# Patient Record
Sex: MALE | Race: WHITE | NOT HISPANIC OR LATINO
[De-identification: names, ages, dates, MRNs, and addresses within clinical notes are randomized per-mention and may not be internally consistent; named-entity substitution may affect disease eponyms.]

---

## 2020-04-22 ENCOUNTER — APPOINTMENT (OUTPATIENT)
Dept: ORTHOPEDIC SURGERY | Facility: CLINIC | Age: 60
End: 2020-04-22
Payer: COMMERCIAL

## 2020-04-22 VITALS
HEART RATE: 85 BPM | TEMPERATURE: 97.9 F | BODY MASS INDEX: 34.46 KG/M2 | WEIGHT: 260 LBS | SYSTOLIC BLOOD PRESSURE: 156 MMHG | HEIGHT: 73 IN | DIASTOLIC BLOOD PRESSURE: 97 MMHG

## 2020-04-22 PROCEDURE — 20610 DRAIN/INJ JOINT/BURSA W/O US: CPT | Mod: LT

## 2020-04-22 PROCEDURE — 99203 OFFICE O/P NEW LOW 30 MIN: CPT | Mod: 25

## 2020-04-22 PROCEDURE — 73562 X-RAY EXAM OF KNEE 3: CPT | Mod: 50

## 2020-04-22 RX ORDER — MELOXICAM 15 MG/1
15 TABLET ORAL DAILY
Qty: 40 | Refills: 1 | Status: ACTIVE | COMMUNITY
Start: 2020-04-22 | End: 1900-01-01

## 2020-04-22 RX ORDER — HYALURONATE SOD, CROSS-LINKED 30 MG/3 ML
30 SYRINGE (ML) INTRAARTICULAR
Qty: 1 | Refills: 0 | Status: ACTIVE | OUTPATIENT
Start: 2020-04-22

## 2020-04-22 RX ORDER — METHYLPRED ACET/NACL,ISO-OS/PF 40 MG/ML
40 VIAL (ML) INJECTION
Qty: 1 | Refills: 0 | Status: COMPLETED | OUTPATIENT
Start: 2020-04-22

## 2020-04-22 RX ORDER — LIDOCAINE HYDROCHLORIDE 10 MG/ML
1 INJECTION, SOLUTION INFILTRATION; PERINEURAL
Refills: 0 | Status: COMPLETED | OUTPATIENT
Start: 2020-04-22

## 2020-04-22 RX ADMIN — METHYLPREDNISOLONE ACETATE 2 MG/ML: 40 INJECTION, SUSPENSION INTRA-ARTICULAR; INTRALESIONAL; INTRAMUSCULAR; SOFT TISSUE at 00:00

## 2020-04-22 RX ADMIN — LIDOCAINE HYDROCHLORIDE 3 %: 10 INJECTION, SOLUTION INFILTRATION; PERINEURAL at 00:00

## 2020-09-22 ENCOUNTER — APPOINTMENT (OUTPATIENT)
Dept: ORTHOPEDIC SURGERY | Facility: CLINIC | Age: 60
End: 2020-09-22

## 2022-04-25 ENCOUNTER — APPOINTMENT (OUTPATIENT)
Dept: ORTHOPEDIC SURGERY | Facility: CLINIC | Age: 62
End: 2022-04-25
Payer: MEDICARE

## 2022-04-25 ENCOUNTER — NON-APPOINTMENT (OUTPATIENT)
Age: 62
End: 2022-04-25

## 2022-04-25 VITALS — SYSTOLIC BLOOD PRESSURE: 150 MMHG | HEART RATE: 68 BPM | DIASTOLIC BLOOD PRESSURE: 82 MMHG

## 2022-04-25 PROCEDURE — 73562 X-RAY EXAM OF KNEE 3: CPT | Mod: 50

## 2022-04-25 PROCEDURE — 20610 DRAIN/INJ JOINT/BURSA W/O US: CPT | Mod: LT

## 2022-04-25 PROCEDURE — 99214 OFFICE O/P EST MOD 30 MIN: CPT | Mod: 25

## 2022-05-27 ENCOUNTER — APPOINTMENT (OUTPATIENT)
Dept: ORTHOPEDIC SURGERY | Facility: CLINIC | Age: 62
End: 2022-05-27
Payer: MEDICARE

## 2022-05-27 VITALS — WEIGHT: 260 LBS | BODY MASS INDEX: 34.46 KG/M2 | HEIGHT: 73 IN

## 2022-05-27 VITALS — BODY MASS INDEX: 34.46 KG/M2 | HEIGHT: 73 IN | WEIGHT: 260 LBS

## 2022-05-27 PROCEDURE — 99214 OFFICE O/P EST MOD 30 MIN: CPT

## 2022-05-27 NOTE — HISTORY OF PRESENT ILLNESS
[Stable] : stable [Walking] : walking [Standing] : standing [Intermit.] : ~He/She~ states the symptoms seem to be intermittent [de-identified] : Patient is a 61-year-old male who presents today for an evaluation regarding his left knee he states that it started most recently several months ago with an on and off discomfort in his knee. Recently underwent a right total knee replacement performed by Dr. Smith and did very well. He did very well for many years but has this pain in the left knee started he decided to seek medical attention with her progress. He was seen in the office and given a cortisone injection several weeks ago. Which did improve his condition and condition tremendously. Currently he states the pain is a one on a scale of 1-10 prior to the injection the pain was close to it. He states the pain is on and off particularly with more strenuous activities including prolonged standing and walking. He also states that he gets some swelling and some stiffness at times

## 2022-05-27 NOTE — REASON FOR VISIT
[Initial Visit] : an initial visit for [Knee Pain] : knee pain [FreeTextEntry2] : Consult left knee pain. Referred by Dr. Sanchez. Had a cortisone injection tot he left knee about a month ago which helped to relieve pain.

## 2022-05-27 NOTE — END OF VISIT
[Time Spent: ___ minutes] : I have spent [unfilled] minutes of time on the encounter. [FreeTextEntry3] : I, Dr. Torres, personally performed the evaluation and management services of this new patient. That E/M includes conducting the initial examination, assessing all  conditions, and establishing the plan of care. Today, MY ACP was here to observe my evaluation and management services of this patient to be followed going forward.\par

## 2022-05-27 NOTE — PHYSICAL EXAM
[Normal] : Gait: normal [LE] : Sensory: Intact in bilateral lower extremities [ALL] : dorsalis pedis, posterior tibial, femoral, popliteal, and radial 2+ and symmetric bilaterally [de-identified] : On physical examination of the left knee. The skin is clean dry and intact with no areas of swelling is noted. There is some pain and tenderness at the patellofemoral compartment as well as the medial femoral tibial compartment. He has excellent range of motion both passively and actively without any discomfort. There is no evidence of ligamentous laxity. Neurovascular intact with no evidence of any motor or sensory deficit. Patient has good distal pulses with no calf tenderness. [de-identified] : X-rays of the left knee reveal some osteoarthritic changes. No evidence of any fracture or dislocation. Some osteophytes with joint space narrowing is noted. These x-rays were not taken today. They were taken previously in the office.

## 2022-05-27 NOTE — DISCUSSION/SUMMARY
[de-identified] : After a thorough history full examination and review of the x-rays. It was explained to the patient that he does have osteoarthritic changes within the left knee. He was explained the different modalities of treatment which include conservative versus surgical intervention. He has had good success with management. It is recommended that he continue with his management. This includes is moist heat anti-inflammatories and pain medication when needed. The patient was also explained that he can continue with the cortisone injections and these had good relief as well as in the past with cortisone. He was explained the risks benefits pros and injection as well as alternatives. He wasn't splinted there is no guarantee for complete relief and that this is not a cure for the osteoarthritis. He did understand and was also explained in the foreseeable future he will require a total knee replacement. However at the present time it is suggested that he continue with his conservative management. The patient was turned back to the office should this pain return or worsen.\par \par I saw and evaluated the patient. I discussed and reviewed the case details with the PA and agree with the PA's findings and plan as documented in the PA note.\par \par

## 2022-12-09 DIAGNOSIS — M25.552 PAIN IN LEFT HIP: ICD-10-CM

## 2023-01-06 ENCOUNTER — APPOINTMENT (OUTPATIENT)
Dept: ORTHOPEDIC SURGERY | Facility: CLINIC | Age: 63
End: 2023-01-06
Payer: MEDICARE

## 2023-01-06 ENCOUNTER — NON-APPOINTMENT (OUTPATIENT)
Age: 63
End: 2023-01-06

## 2023-01-06 VITALS
WEIGHT: 260 LBS | BODY MASS INDEX: 34.46 KG/M2 | DIASTOLIC BLOOD PRESSURE: 84 MMHG | HEART RATE: 72 BPM | HEIGHT: 73 IN | SYSTOLIC BLOOD PRESSURE: 132 MMHG

## 2023-01-06 PROCEDURE — 20610 DRAIN/INJ JOINT/BURSA W/O US: CPT | Mod: LT

## 2023-01-06 PROCEDURE — 99213 OFFICE O/P EST LOW 20 MIN: CPT | Mod: 25

## 2023-01-06 PROCEDURE — 73521 X-RAY EXAM HIPS BI 2 VIEWS: CPT

## 2023-01-06 PROCEDURE — 73562 X-RAY EXAM OF KNEE 3: CPT | Mod: RT

## 2023-01-06 NOTE — HISTORY OF PRESENT ILLNESS
[de-identified] : Patient presents today for evaluation of ongoing right hip and thigh and left knee pain.  The patient was last seen in May.  Patient is known to have advanced osteoarthritis of the left knee.  He is status post right total knee arthroplasty about 15 years ago.  He has no active complaints of his right knee.  He currently reports of stiffness when going to stand from a seated position.  He has difficulties with navigating stairs.  He reports of pain to both areas when he walks for an extended period of time.  He is not clear as to which side bothers him more.  He is not using a cane or a walker.  No buckling or locking.  No use of a brace.  He is not regularly taking oral pain medications for the complaint.\par \par Review of Systems-\par Constitutional: No fever or chills. \par Cardiovascular: No orthopnea or chest pain\par Pulmonary: No shortness of breath. \par GI: No nausea or vomiting or abdominal pain.\par Musculoskeletal: see HPI \par Psychiatric: No anxiety and depression.

## 2023-01-06 NOTE — PHYSICAL EXAM
[de-identified] : The patient is conversive and in no apparent distress. The patient is alert and oriented to person, place, and time. Affect and mood appear normal. The head is normocephalic and atraumatic. Skin shows normal turgor with no evidence of eczema or psoriasis. No respiratory distress noted. Sensation grossly intact. MUSCULOSKELETAL:   SEE BELOW\par \par Bilateral knee exam demonstrates normal alignment of the right knee and mildly varus alignment of the left knee(less than -2 degrees).  Well-healed surgical incision of the right knee consistent with total knee arthroplasty.  No large surgical incisions of the right knee.  No signs of acute trauma.  Normal temperature.  No effusions.  No erythema or ecchymosis.  Passive range of motion of both knees is 0 degrees of extension to 110 degrees of flexion.  Mild patellofemoral crepitus of the left knee.  No crepitus of the right knee.  Minimal medial/lateral laxity of either knee.  No flexion contracture.  No extensor mechanism lag.\par \par Bilateral hip exam demonstrates equal leg lengths.  Stiffness of the right hip is mildly greater than the left hip.  Left hip range of motion demonstrates external rotation of 40 degrees and internal rotation of 10 degrees.  Hip flexion is 60 degrees.  Hip abduction is 10 degrees and hip abduction is 25 degrees.  Right hip flexion is 60 degrees.  Hip abduction is neutral.  Hip abduction is 20 degrees.  Internal rotation of neutral.  External rotation of 20 degrees.\par \par Mild varicose veins distally.  No significant venous stasis.  No ulcerations.  2+ DP pulse.  Normal sensation to light touch.  5/5 motor function against resistance of the hips, knees and ankles.  [de-identified] : Bilateral hip and AP view demonstrates marked joint space narrowing of the right hip.  Normal joint spacing of the left hip is appreciated.  No hardware of the hips is appreciated.\par \par Bilateral three-view knee x-rays were reviewed.  Right total knee arthroplasty ([employer] implants) are noted.  Left knee demonstrates marked medial joint space narrowing diffusely.  Osteophyte formation is noted diffusely.

## 2023-01-06 NOTE — DISCUSSION/SUMMARY
[de-identified] : 25 minutes was spent reviewing the x-rays as well as discussing with the patient their clinical presentation, diagnosis and providing education.  The patient was shown the advance degenerative changes of the right hip and the left knee.  The patient has had success with past left knee corticosteroid injections.  A repeat injection was performed today.  He will monitor for improvement.  The patient will be sent for an ultrasound-guided right hip intra-articular corticosteroid and anesthetic injection.  The patient will return to the office in 2 to 3 months for reevaluation.  Meloxicam was prescribed.  Instruction and education provided.  He will modify activities.  He will be seen sooner should his condition change.  All questions were answered to the patient satisfaction.

## 2023-01-06 NOTE — REASON FOR VISIT
[Follow-Up Visit] : a follow-up visit for [Hip Pain] : hip pain [Artificial Knee Joint] : an artificial knee joint

## 2023-01-06 NOTE — END OF VISIT
[FreeTextEntry3] : I, Dr. Torres, personally performed the evaluation and management services for this established patient who presented today with a new problem/exacerbation of an existing condition. That E/M includes conducting the examination, assessing all new/exacerbated conditions, and establishing a new plan of care. Today, MY ACP was here to assist my evaluation and management services of this new problem/exacerbated condition to be followed going forward.\par

## 2023-01-06 NOTE — PROCEDURE
[de-identified] : Procedure: Injection of the left knee. \par Indication:  Osteoarthritis. \par Risk and benefits were discussed with the patient. Potential complications include bleeding and infection. Verbal consent was obtained prior to the procedure. \par Chloraprep was used to prep the area. ethyl chloride spray was used as a topical anesthetic. Using sterile technique, the aspiration/injection needle was then directed from a [default value] aspect was used to inject 5 mL of 1% Lidocaine and 2 mL of 40mg/mL methylprednisolone. A bandage was applied. The patient tolerated the procedure well. \par Complications: none. \par Patient instructed to avoid strenuous activity for 1 day(s). \par Follow-up in the office as needed.

## 2023-05-22 RX ORDER — MELOXICAM 15 MG/1
15 TABLET ORAL
Qty: 30 | Refills: 0 | Status: ACTIVE | COMMUNITY
Start: 2023-01-06 | End: 1900-01-01

## 2023-06-27 ENCOUNTER — APPOINTMENT (OUTPATIENT)
Dept: ORTHOPEDIC SURGERY | Facility: CLINIC | Age: 63
End: 2023-06-27
Payer: MEDICARE

## 2023-06-27 VITALS
SYSTOLIC BLOOD PRESSURE: 149 MMHG | HEART RATE: 72 BPM | BODY MASS INDEX: 34.46 KG/M2 | HEIGHT: 73 IN | DIASTOLIC BLOOD PRESSURE: 89 MMHG | WEIGHT: 260 LBS

## 2023-06-27 DIAGNOSIS — Z96.659 PRESENCE OF UNSPECIFIED ARTIFICIAL KNEE JOINT: ICD-10-CM

## 2023-06-27 DIAGNOSIS — M16.11 UNILATERAL PRIMARY OSTEOARTHRITIS, RIGHT HIP: ICD-10-CM

## 2023-06-27 PROCEDURE — 73502 X-RAY EXAM HIP UNI 2-3 VIEWS: CPT | Mod: RT

## 2023-06-27 PROCEDURE — 73562 X-RAY EXAM OF KNEE 3: CPT | Mod: RT

## 2023-06-27 PROCEDURE — 99214 OFFICE O/P EST MOD 30 MIN: CPT

## 2023-06-27 RX ORDER — MELOXICAM 15 MG/1
15 TABLET ORAL
Qty: 30 | Refills: 0 | Status: ACTIVE | COMMUNITY
Start: 2023-06-27 | End: 1900-01-01

## 2023-06-28 NOTE — PHYSICAL EXAM
[LE] : Sensory: Intact in bilateral lower extremities [DP] : dorsalis pedis 2+ and symmetric bilaterally [Normal RLE] : Right Lower Extremity: No scars, rashes, lesions, ulcers, skin intact [Normal Touch] : sensation intact for touch [Normal] : no peripheral adenopathy appreciated [de-identified] : On physical exam of the right hip skin is warm and dry without erythema ecchymosis signs or symptoms of infection superficial or deep.  There is tenderness noted at the lateral hip.  Range of motion is limited in flexion, external and internal rotation secondary to pain.  Strength is maintained in all planes.  Sensation is intact throughout the distal lower extremity.  There are 2+ dorsalis pedis pulse.  Leg lengths appear equal measured at the medial malleolus. Negative Jeni's exam \par \par On physical exam of the right knee skin is warm and dry without erythema ecchymosis signs or symptoms of infection superficial or deep, there is a well healed surgical incision.  There is no tenderness to palpation throughout the knee joint.  There is no palpable effusion.  Range of motion is 0-130 without pain or stiffness.  The knee is stable with varus and valgus stress.  There is a negative anterior posterior drawer.  Sensation is intact throughout the distal lower extremity.  Strength is well-maintained in all planes.  There is negative Homans exam.  2+ dorsalis pedis pulse.  [de-identified] : Radiographs of the pelvis and right hip were performed today in the [place] office.\par Grade 4 bone-on-bone cartilaginous wear in the F- A joint.\par Subchondral sclerosis noted on both sides the F-A  joint.\par Superior and inferior rim osteophytes are noted.\par \par Radiographs of the right knee were performed today. There are stable interfaces between bone, cement, and implant in all 3 components. There is stable positioning of the femoral, tibial, and patellar components. There is equidistant spacing on each side of the tibial bearing. There is no fracture, foreign body, subsidement, osteolysis, or notable effusion. The patellar component is tracking centrally in the trochlear groove of the femoral component.

## 2023-06-28 NOTE — REASON FOR VISIT
[Follow-Up Visit] : a follow-up visit for [Hip Pain] : hip pain [Knee Pain] : knee pain [FreeTextEntry2] : S/P Right knee TKR DOS 3/5/2007, Right hip pain for 3 months worsening

## 2023-06-28 NOTE — DISCUSSION/SUMMARY
[Medication Risks Reviewed] : Medication risks reviewed [Surgical risks reviewed] : Surgical risks reviewed [de-identified] : Dr. Torres evaluated this patient and will be guiding this patient's entire orthopedic management plan. The patient was advised that based upon their clinical presentation and radiographic findings they meet inclusion criteria for benefitting from a right total hip arthroplasty as a treatment option for severe arthritis of the hip.\par \par The spectrum of treatments for severe hip DJD were reviewed in detail. I reviewed in detail the goals, alternatives, risks and benefits of total hip arthroplasty.\par \par The patient was advised of the possible complications which are inclusive of but not exclusive to VTE-related, infectious-related, anesthetic-related, surgically-related, medically-related, dislocation-related, and implant-related.\par \par The patient was advised that limb length equality may not be assured secondary to variabilities in pelvic malrotation, stable intraoperative fit, opposite side wear, and other anatomic deformities of the lower extremity. The patient agreed to the plan of care, as well as use of implants.\par \par A non-cemented type hip implant is utilized after consideration of bony integrity intraoperatively. The patient was demonstrated. A model of the total hip replacement. Advised on the brand and preliminary model of the implant that I use. A DJO with Ceramic on Poly bearing surfaces is the implant I feel comfortable utilizing in my Primary THRs and the implant I am planning for her/his THR. If the patient wishes to utilize a different implant brand/type for their THR, they may obtain an additional surgical opinion from a Surgeon utilizing that brand implant\par \par The patient was advised that they will require a medical preoperative risk evaluation by their PCP. Further medical subspecialty clearances such as cardiac may be indicated if felt needed by their PCP.\par \par The patient was advised he/she may call our office after careful consideration of their treatment options and further consultation with any other physician to begin the process of preoperative planning for elective Right anterior THR at a time of their choosing.

## 2023-06-28 NOTE — HISTORY OF PRESENT ILLNESS
[Worsening] : worsening [2] : a current pain level of 2/10 [7] : an average pain level of 7/10 [8] : a maximum pain level of 8/10 [Sitting] : sitting [Standing] : standing [Daily] : ~He/She~ states the symptoms seem to be occuring daily [Bending] : worsened by bending [Hip Movement] : worsened by hip movement [Walking] : worsened by walking [NSAIDs] : relieved by nonsteroidal anti-inflammatory drugs [Rest] : relieved by rest [de-identified] : 63-year-old male presents for follow-up of the right hip as well as the right knee.  He is status post right total knee replacement on 3/5/2007.  Since the last office visit his symptoms are remaining stable.  In the right hip his pain currently is at a 2 out of 10 however going up and down stairs as well as walking up an incline can make the pain go up to a 7 out of 10.  He had a ultrasound-guided cortisone injection into the right hip without any improvement.  He is continuing to have right sided groin pain as well as pain into the buttocks.  He is an avid golfer and he is having difficulty playing golf due to pain.  He often has to rest for the day before as well as premedicate with meloxicam in order to get through the game and then the next day he is in a significant amount of pain.  He is taking meloxicam with some improvement in symptoms.  Regards to the right knee currently the pain is rated as a 0 out of 10 however can worsen with use to about 4 out of 10.\par Denies any fevers chills chest pain calf pain shortness of breath

## 2023-06-28 NOTE — END OF VISIT
[FreeTextEntry3] : I, Dr. Ni Torres MD, personally performed the evaluation and management services for this established patient who presented today with a new problem/exacerbation of an existing condition. That E/M includes conducting the examination, assessing all new/exacerbated conditions, and establishing a new plan of care. Today, MY ACP was here to assist with recording the history in my evaluation and management services of this new problem/exacerbated condition to be followed going forward.\par

## 2023-09-08 ENCOUNTER — OUTPATIENT (OUTPATIENT)
Dept: OUTPATIENT SERVICES | Facility: HOSPITAL | Age: 63
LOS: 1 days | End: 2023-09-08
Payer: MEDICARE

## 2023-09-08 VITALS
SYSTOLIC BLOOD PRESSURE: 120 MMHG | TEMPERATURE: 97 F | WEIGHT: 267.86 LBS | HEIGHT: 72 IN | DIASTOLIC BLOOD PRESSURE: 72 MMHG | OXYGEN SATURATION: 97 % | RESPIRATION RATE: 16 BRPM | HEART RATE: 72 BPM

## 2023-09-08 DIAGNOSIS — M16.11 UNILATERAL PRIMARY OSTEOARTHRITIS, RIGHT HIP: ICD-10-CM

## 2023-09-08 DIAGNOSIS — Z91.89 OTHER SPECIFIED PERSONAL RISK FACTORS, NOT ELSEWHERE CLASSIFIED: ICD-10-CM

## 2023-09-08 DIAGNOSIS — Z96.651 PRESENCE OF RIGHT ARTIFICIAL KNEE JOINT: Chronic | ICD-10-CM

## 2023-09-08 DIAGNOSIS — Z96.611 PRESENCE OF RIGHT ARTIFICIAL SHOULDER JOINT: Chronic | ICD-10-CM

## 2023-09-08 LAB
A1C WITH ESTIMATED AVERAGE GLUCOSE RESULT: 6 % — HIGH (ref 4–5.6)
ALBUMIN SERPL ELPH-MCNC: 4 G/DL — SIGNIFICANT CHANGE UP (ref 3.3–5)
ALP SERPL-CCNC: 73 U/L — SIGNIFICANT CHANGE UP (ref 30–120)
ALT FLD-CCNC: 33 U/L — SIGNIFICANT CHANGE UP (ref 10–60)
ANION GAP SERPL CALC-SCNC: 8 MMOL/L — SIGNIFICANT CHANGE UP (ref 5–17)
APTT BLD: 31.2 SEC — SIGNIFICANT CHANGE UP (ref 24.5–35.6)
AST SERPL-CCNC: 22 U/L — SIGNIFICANT CHANGE UP (ref 10–40)
BILIRUB SERPL-MCNC: 0.5 MG/DL — SIGNIFICANT CHANGE UP (ref 0.2–1.2)
BLD GP AB SCN SERPL QL: SIGNIFICANT CHANGE UP
BUN SERPL-MCNC: 22 MG/DL — SIGNIFICANT CHANGE UP (ref 7–23)
CALCIUM SERPL-MCNC: 9.3 MG/DL — SIGNIFICANT CHANGE UP (ref 8.4–10.5)
CHLORIDE SERPL-SCNC: 104 MMOL/L — SIGNIFICANT CHANGE UP (ref 96–108)
CO2 SERPL-SCNC: 27 MMOL/L — SIGNIFICANT CHANGE UP (ref 22–31)
CREAT SERPL-MCNC: 1.26 MG/DL — SIGNIFICANT CHANGE UP (ref 0.5–1.3)
EGFR: 63 ML/MIN/1.73M2 — SIGNIFICANT CHANGE UP
ESTIMATED AVERAGE GLUCOSE: 126 MG/DL — HIGH (ref 68–114)
GLUCOSE SERPL-MCNC: 106 MG/DL — HIGH (ref 70–99)
HCT VFR BLD CALC: 41.5 % — SIGNIFICANT CHANGE UP (ref 39–50)
HGB BLD-MCNC: 13.7 G/DL — SIGNIFICANT CHANGE UP (ref 13–17)
INR BLD: 0.97 RATIO — SIGNIFICANT CHANGE UP (ref 0.85–1.18)
MCHC RBC-ENTMCNC: 29.3 PG — SIGNIFICANT CHANGE UP (ref 27–34)
MCHC RBC-ENTMCNC: 33 GM/DL — SIGNIFICANT CHANGE UP (ref 32–36)
MCV RBC AUTO: 88.9 FL — SIGNIFICANT CHANGE UP (ref 80–100)
MRSA PCR RESULT.: SIGNIFICANT CHANGE UP
NRBC # BLD: 0 /100 WBCS — SIGNIFICANT CHANGE UP (ref 0–0)
PLATELET # BLD AUTO: 194 K/UL — SIGNIFICANT CHANGE UP (ref 150–400)
POTASSIUM SERPL-MCNC: 4.5 MMOL/L — SIGNIFICANT CHANGE UP (ref 3.5–5.3)
POTASSIUM SERPL-SCNC: 4.5 MMOL/L — SIGNIFICANT CHANGE UP (ref 3.5–5.3)
PROT SERPL-MCNC: 7.7 G/DL — SIGNIFICANT CHANGE UP (ref 6–8.3)
PROTHROM AB SERPL-ACNC: 10.9 SEC — SIGNIFICANT CHANGE UP (ref 9.5–13)
RBC # BLD: 4.67 M/UL — SIGNIFICANT CHANGE UP (ref 4.2–5.8)
RBC # FLD: 13.4 % — SIGNIFICANT CHANGE UP (ref 10.3–14.5)
S AUREUS DNA NOSE QL NAA+PROBE: DETECTED
SODIUM SERPL-SCNC: 139 MMOL/L — SIGNIFICANT CHANGE UP (ref 135–145)
WBC # BLD: 5 K/UL — SIGNIFICANT CHANGE UP (ref 3.8–10.5)
WBC # FLD AUTO: 5 K/UL — SIGNIFICANT CHANGE UP (ref 3.8–10.5)

## 2023-09-08 PROCEDURE — 93005 ELECTROCARDIOGRAM TRACING: CPT

## 2023-09-08 PROCEDURE — 93010 ELECTROCARDIOGRAM REPORT: CPT

## 2023-09-08 PROCEDURE — G0463: CPT

## 2023-09-08 NOTE — H&P PST ADULT - NSICDXPROCEDURE_GEN_ALL_CORE_FT
PROCEDURES:  Total replacement of right hip joint by anterior approach 08-Sep-2023 08:34:07  Rahel Crowe

## 2023-09-08 NOTE — H&P PST ADULT - NSICDXFAMILYHX_GEN_ALL_CORE_FT
FAMILY HISTORY:  Father  Still living? No  Family history of MI (myocardial infarction), Age at diagnosis: Age Unknown    Mother  Still living? No  Family history of coronary artery disease in mother, Age at diagnosis: Age Unknown

## 2023-09-08 NOTE — H&P PST ADULT - PROBLEM SELECTOR PLAN 1
RIGHT Total Anterior Hip Replacement on 09/27/2023.   Medical Clearance by PMD  NPO as per Anesthesia- Uloric on AM of surgery with Gatorade  Hold Motrin, Voltaren and Meloxicam starting on 9/20.  Tylenol prn for pain.   Instructions reviewed and questions addressed  Pt denies any metal allergies

## 2023-09-08 NOTE — H&P PST ADULT - NSICDXPASTSURGICALHX_GEN_ALL_CORE_FT
PAST SURGICAL HISTORY:  S/P reverse total shoulder arthroplasty, right     S/P total knee replacement, right

## 2023-09-08 NOTE — H&P PST ADULT - MUSCULOSKELETAL
decreased ROM/decreased ROM due to pain/decreased strength details… right hip/no calf tenderness/decreased ROM/decreased ROM due to pain/decreased strength

## 2023-09-08 NOTE — H&P PST ADULT - NSANTHOSAYNRD_GEN_A_CORE
No. CED screening performed.  STOP BANG Legend: 0-2 = LOW Risk; 3-4 = INTERMEDIATE Risk; 5-8 = HIGH Risk

## 2023-09-08 NOTE — H&P PST ADULT - NSICDXPASTMEDICALHX_GEN_ALL_CORE_FT
PAST MEDICAL HISTORY:  Class 2 obesity with body mass index (BMI) of 36.0 to 36.9 in adult     Gout     Osteoarthritis

## 2023-09-08 NOTE — H&P PST ADULT - HISTORY OF PRESENT ILLNESS
62yo male patient with approximately 9 month hx of progressively worsening right hip pain. He has had Cortisone injection without relief and PT with mild improvement. He rates the pain at 7-8/10 and he is taking Meloxicam or Motrin or Tylenol and Voltaren topical prn with some relief. THR has been recommended and he presents today for PSTs.

## 2023-09-11 RX ORDER — MUPIROCIN 20 MG/G
1 OINTMENT TOPICAL
Qty: 1 | Refills: 0
Start: 2023-09-11 | End: 2023-09-15

## 2023-09-25 PROBLEM — M19.90 UNSPECIFIED OSTEOARTHRITIS, UNSPECIFIED SITE: Chronic | Status: ACTIVE | Noted: 2023-09-08

## 2023-09-25 PROBLEM — E66.9 OBESITY, UNSPECIFIED: Chronic | Status: ACTIVE | Noted: 2023-09-08

## 2023-09-25 PROBLEM — M10.9 GOUT, UNSPECIFIED: Chronic | Status: ACTIVE | Noted: 2023-09-08

## 2023-09-27 ENCOUNTER — TRANSCRIPTION ENCOUNTER (OUTPATIENT)
Age: 63
End: 2023-09-27

## 2023-09-27 ENCOUNTER — OUTPATIENT (OUTPATIENT)
Dept: OUTPATIENT SERVICES | Facility: HOSPITAL | Age: 63
LOS: 1 days | End: 2023-09-27
Payer: MEDICARE

## 2023-09-27 ENCOUNTER — APPOINTMENT (OUTPATIENT)
Dept: ORTHOPEDIC SURGERY | Facility: HOSPITAL | Age: 63
End: 2023-09-27

## 2023-09-27 VITALS
WEIGHT: 268.3 LBS | SYSTOLIC BLOOD PRESSURE: 138 MMHG | RESPIRATION RATE: 15 BRPM | HEART RATE: 80 BPM | TEMPERATURE: 99 F | HEIGHT: 73 IN | OXYGEN SATURATION: 95 % | DIASTOLIC BLOOD PRESSURE: 80 MMHG

## 2023-09-27 DIAGNOSIS — Z96.651 PRESENCE OF RIGHT ARTIFICIAL KNEE JOINT: Chronic | ICD-10-CM

## 2023-09-27 DIAGNOSIS — Z96.611 PRESENCE OF RIGHT ARTIFICIAL SHOULDER JOINT: Chronic | ICD-10-CM

## 2023-09-27 DIAGNOSIS — M16.11 UNILATERAL PRIMARY OSTEOARTHRITIS, RIGHT HIP: ICD-10-CM

## 2023-09-27 LAB — ABO RH CONFIRMATION: SIGNIFICANT CHANGE UP

## 2023-09-27 PROCEDURE — 99203 OFFICE O/P NEW LOW 30 MIN: CPT

## 2023-09-27 PROCEDURE — 27130 TOTAL HIP ARTHROPLASTY: CPT | Mod: RT

## 2023-09-27 DEVICE — BONE WAX 2.5GM: Type: IMPLANTABLE DEVICE | Site: RIGHT | Status: FUNCTIONAL

## 2023-09-27 DEVICE — SLEEVE BIOLOX DELTA OPTION NEUTRAL: Type: IMPLANTABLE DEVICE | Site: RIGHT | Status: FUNCTIONAL

## 2023-09-27 DEVICE — HEAD FEM OPTION CERAMIC DELTA 40MM: Type: IMPLANTABLE DEVICE | Site: RIGHT | Status: FUNCTIONAL

## 2023-09-27 DEVICE — SCREW ACET EMPOWR 6.5X35MM: Type: IMPLANTABLE DEVICE | Site: RIGHT | Status: FUNCTIONAL

## 2023-09-27 DEVICE — LINER ACET EMPOWR HXA NEUT 40MM ALPHA H: Type: IMPLANTABLE DEVICE | Site: RIGHT | Status: FUNCTIONAL

## 2023-09-27 DEVICE — CUP ACET EMPOWR CLUSTER 58MM APLHA H: Type: IMPLANTABLE DEVICE | Site: RIGHT | Status: FUNCTIONAL

## 2023-09-27 DEVICE — K-WIRE MICROAIRE (THREADED) SINGLE TROCAR 4.8MM X 9": Type: IMPLANTABLE DEVICE | Site: RIGHT | Status: FUNCTIONAL

## 2023-09-27 DEVICE — IMPLANTABLE DEVICE: Type: IMPLANTABLE DEVICE | Site: RIGHT | Status: FUNCTIONAL

## 2023-09-27 RX ORDER — APREPITANT 80 MG/1
40 CAPSULE ORAL ONCE
Refills: 0 | Status: COMPLETED | OUTPATIENT
Start: 2023-09-27 | End: 2023-09-27

## 2023-09-27 RX ORDER — SENNA PLUS 8.6 MG/1
2 TABLET ORAL AT BEDTIME
Refills: 0 | Status: DISCONTINUED | OUTPATIENT
Start: 2023-09-27 | End: 2023-10-11

## 2023-09-27 RX ORDER — OMEPRAZOLE 10 MG/1
1 CAPSULE, DELAYED RELEASE ORAL
Qty: 28 | Refills: 0
Start: 2023-09-27 | End: 2023-10-24

## 2023-09-27 RX ORDER — ACETAMINOPHEN 500 MG
1000 TABLET ORAL ONCE
Refills: 0 | Status: COMPLETED | OUTPATIENT
Start: 2023-09-27 | End: 2023-09-27

## 2023-09-27 RX ORDER — ONDANSETRON 8 MG/1
4 TABLET, FILM COATED ORAL EVERY 6 HOURS
Refills: 0 | Status: DISCONTINUED | OUTPATIENT
Start: 2023-09-27 | End: 2023-10-11

## 2023-09-27 RX ORDER — ASPIRIN/CALCIUM CARB/MAGNESIUM 324 MG
1 TABLET ORAL
Qty: 56 | Refills: 0
Start: 2023-09-27 | End: 2023-10-24

## 2023-09-27 RX ORDER — CELECOXIB 200 MG/1
200 CAPSULE ORAL EVERY 12 HOURS
Refills: 0 | Status: DISCONTINUED | OUTPATIENT
Start: 2023-09-27 | End: 2023-10-11

## 2023-09-27 RX ORDER — CELECOXIB 200 MG/1
1 CAPSULE ORAL
Qty: 56 | Refills: 0
Start: 2023-09-27 | End: 2023-10-24

## 2023-09-27 RX ORDER — HYDROMORPHONE HYDROCHLORIDE 2 MG/ML
0.5 INJECTION INTRAMUSCULAR; INTRAVENOUS; SUBCUTANEOUS
Refills: 0 | Status: DISCONTINUED | OUTPATIENT
Start: 2023-09-27 | End: 2023-09-27

## 2023-09-27 RX ORDER — ASPIRIN/CALCIUM CARB/MAGNESIUM 324 MG
81 TABLET ORAL EVERY 12 HOURS
Refills: 0 | Status: DISCONTINUED | OUTPATIENT
Start: 2023-09-28 | End: 2023-10-11

## 2023-09-27 RX ORDER — CHLORHEXIDINE GLUCONATE 213 G/1000ML
1 SOLUTION TOPICAL ONCE
Refills: 0 | Status: COMPLETED | OUTPATIENT
Start: 2023-09-27 | End: 2023-09-27

## 2023-09-27 RX ORDER — SODIUM CHLORIDE 9 MG/ML
1000 INJECTION, SOLUTION INTRAVENOUS
Refills: 0 | Status: DISCONTINUED | OUTPATIENT
Start: 2023-09-27 | End: 2023-09-27

## 2023-09-27 RX ORDER — ACETAMINOPHEN 500 MG
1000 TABLET ORAL EVERY 8 HOURS
Refills: 0 | Status: DISCONTINUED | OUTPATIENT
Start: 2023-09-27 | End: 2023-10-11

## 2023-09-27 RX ORDER — SENNA PLUS 8.6 MG/1
2 TABLET ORAL
Qty: 0 | Refills: 0 | DISCHARGE
Start: 2023-09-27

## 2023-09-27 RX ORDER — IBUPROFEN 200 MG
2 TABLET ORAL
Refills: 0 | DISCHARGE

## 2023-09-27 RX ORDER — SODIUM CHLORIDE 9 MG/ML
500 INJECTION INTRAMUSCULAR; INTRAVENOUS; SUBCUTANEOUS ONCE
Refills: 0 | Status: COMPLETED | OUTPATIENT
Start: 2023-09-27 | End: 2023-09-27

## 2023-09-27 RX ORDER — CEFAZOLIN SODIUM 1 G
3000 VIAL (EA) INJECTION ONCE
Refills: 0 | Status: COMPLETED | OUTPATIENT
Start: 2023-09-27 | End: 2023-09-27

## 2023-09-27 RX ORDER — METOCLOPRAMIDE HCL 10 MG
10 TABLET ORAL ONCE
Refills: 0 | Status: COMPLETED | OUTPATIENT
Start: 2023-09-27 | End: 2023-09-27

## 2023-09-27 RX ORDER — CEFAZOLIN SODIUM 1 G
2000 VIAL (EA) INJECTION EVERY 8 HOURS
Refills: 0 | Status: COMPLETED | OUTPATIENT
Start: 2023-09-27 | End: 2023-09-27

## 2023-09-27 RX ORDER — POLYETHYLENE GLYCOL 3350 17 G/17G
17 POWDER, FOR SOLUTION ORAL
Qty: 0 | Refills: 0 | DISCHARGE
Start: 2023-09-27

## 2023-09-27 RX ORDER — OXYCODONE HYDROCHLORIDE 5 MG/1
10 TABLET ORAL
Refills: 0 | Status: DISCONTINUED | OUTPATIENT
Start: 2023-09-27 | End: 2023-09-27

## 2023-09-27 RX ORDER — ONDANSETRON 8 MG/1
4 TABLET, FILM COATED ORAL ONCE
Refills: 0 | Status: DISCONTINUED | OUTPATIENT
Start: 2023-09-27 | End: 2023-09-27

## 2023-09-27 RX ORDER — POLYETHYLENE GLYCOL 3350 17 G/17G
17 POWDER, FOR SOLUTION ORAL AT BEDTIME
Refills: 0 | Status: DISCONTINUED | OUTPATIENT
Start: 2023-09-27 | End: 2023-10-11

## 2023-09-27 RX ORDER — TRANEXAMIC ACID 100 MG/ML
1000 INJECTION, SOLUTION INTRAVENOUS ONCE
Refills: 0 | Status: COMPLETED | OUTPATIENT
Start: 2023-09-27 | End: 2023-09-27

## 2023-09-27 RX ORDER — DEXAMETHASONE 0.5 MG/5ML
8 ELIXIR ORAL ONCE
Refills: 0 | Status: COMPLETED | OUTPATIENT
Start: 2023-09-28 | End: 2023-09-28

## 2023-09-27 RX ORDER — MAGNESIUM HYDROXIDE 400 MG/1
30 TABLET, CHEWABLE ORAL DAILY
Refills: 0 | Status: DISCONTINUED | OUTPATIENT
Start: 2023-09-27 | End: 2023-10-11

## 2023-09-27 RX ORDER — FEBUXOSTAT 40 MG/1
1 TABLET ORAL
Refills: 0 | DISCHARGE

## 2023-09-27 RX ORDER — MELOXICAM 15 MG/1
1 TABLET ORAL
Refills: 0 | DISCHARGE

## 2023-09-27 RX ORDER — OXYCODONE HYDROCHLORIDE 5 MG/1
5 TABLET ORAL
Refills: 0 | Status: DISCONTINUED | OUTPATIENT
Start: 2023-09-27 | End: 2023-09-27

## 2023-09-27 RX ORDER — SODIUM CHLORIDE 9 MG/ML
1000 INJECTION, SOLUTION INTRAVENOUS
Refills: 0 | Status: DISCONTINUED | OUTPATIENT
Start: 2023-09-28 | End: 2023-10-11

## 2023-09-27 RX ORDER — PANTOPRAZOLE SODIUM 20 MG/1
40 TABLET, DELAYED RELEASE ORAL
Refills: 0 | Status: DISCONTINUED | OUTPATIENT
Start: 2023-09-27 | End: 2023-10-11

## 2023-09-27 RX ORDER — HYDROMORPHONE HYDROCHLORIDE 2 MG/ML
0.2 INJECTION INTRAMUSCULAR; INTRAVENOUS; SUBCUTANEOUS
Refills: 0 | Status: DISCONTINUED | OUTPATIENT
Start: 2023-09-27 | End: 2023-09-27

## 2023-09-27 RX ORDER — CEFAZOLIN SODIUM 1 G
3000 VIAL (EA) INJECTION EVERY 8 HOURS
Refills: 0 | Status: DISCONTINUED | OUTPATIENT
Start: 2023-09-27 | End: 2023-09-27

## 2023-09-27 RX ORDER — DICLOFENAC SODIUM 30 MG/G
1 GEL TOPICAL
Refills: 0 | DISCHARGE

## 2023-09-27 RX ORDER — ACETAMINOPHEN 500 MG
2 TABLET ORAL
Refills: 0 | DISCHARGE

## 2023-09-27 RX ADMIN — SODIUM CHLORIDE 500 MILLILITER(S): 9 INJECTION INTRAMUSCULAR; INTRAVENOUS; SUBCUTANEOUS at 13:37

## 2023-09-27 RX ADMIN — SODIUM CHLORIDE 500 MILLILITER(S): 9 INJECTION INTRAMUSCULAR; INTRAVENOUS; SUBCUTANEOUS at 10:15

## 2023-09-27 RX ADMIN — CHLORHEXIDINE GLUCONATE 1 APPLICATION(S): 213 SOLUTION TOPICAL at 06:45

## 2023-09-27 RX ADMIN — Medication 400 MILLIGRAM(S): at 14:58

## 2023-09-27 RX ADMIN — Medication 100 MILLIGRAM(S): at 16:18

## 2023-09-27 RX ADMIN — Medication 1000 MILLIGRAM(S): at 21:22

## 2023-09-27 RX ADMIN — Medication 100 MILLIGRAM(S): at 23:32

## 2023-09-27 RX ADMIN — CELECOXIB 200 MILLIGRAM(S): 200 CAPSULE ORAL at 21:18

## 2023-09-27 RX ADMIN — OXYCODONE HYDROCHLORIDE 10 MILLIGRAM(S): 5 TABLET ORAL at 16:21

## 2023-09-27 RX ADMIN — Medication 1000 MILLIGRAM(S): at 15:28

## 2023-09-27 RX ADMIN — POLYETHYLENE GLYCOL 3350 17 GRAM(S): 17 POWDER, FOR SOLUTION ORAL at 21:20

## 2023-09-27 RX ADMIN — OXYCODONE HYDROCHLORIDE 10 MILLIGRAM(S): 5 TABLET ORAL at 16:51

## 2023-09-27 RX ADMIN — OXYCODONE HYDROCHLORIDE 10 MILLIGRAM(S): 5 TABLET ORAL at 19:58

## 2023-09-27 RX ADMIN — Medication 10 MILLIGRAM(S): at 23:41

## 2023-09-27 RX ADMIN — OXYCODONE HYDROCHLORIDE 10 MILLIGRAM(S): 5 TABLET ORAL at 20:37

## 2023-09-27 RX ADMIN — OXYCODONE HYDROCHLORIDE 5 MILLIGRAM(S): 5 TABLET ORAL at 14:10

## 2023-09-27 RX ADMIN — CELECOXIB 200 MILLIGRAM(S): 200 CAPSULE ORAL at 21:22

## 2023-09-27 RX ADMIN — SODIUM CHLORIDE 75 MILLILITER(S): 9 INJECTION, SOLUTION INTRAVENOUS at 09:58

## 2023-09-27 RX ADMIN — OXYCODONE HYDROCHLORIDE 5 MILLIGRAM(S): 5 TABLET ORAL at 13:40

## 2023-09-27 RX ADMIN — APREPITANT 40 MILLIGRAM(S): 80 CAPSULE ORAL at 06:44

## 2023-09-27 RX ADMIN — Medication 1000 MILLIGRAM(S): at 21:18

## 2023-09-27 NOTE — DISCHARGE NOTE PROVIDER - NSDCMRMEDTOKEN_GEN_ALL_CORE_FT
meloxicam 15 mg oral tablet: 1 tab(s) orally once a day as needed for  moderate pain  Motrin  mg oral tablet: 2 tab(s) orally every 8 hours as needed for  moderate pain  Multiple Vitamins oral tablet: 1 tab(s) orally once a day  mupirocin 2% topical ointment: 1 application in each nostril 2 times a day  Tylenol 500 mg oral tablet: 2 tab(s) orally every 8 hours as needed for  moderate pain  Uloric 40 mg oral tablet: 1 tab(s) orally once a day  Voltaren 1% topical gel: Apply topically to affected area once a day as needed for  moderate pain   aspirin 81 mg oral delayed release tablet: 1 tab(s) orally every 12 hours Take aspirin 2 hours before Celebrex/Meloxicam  CeleBREX 200 mg oral capsule: 1 cap(s) orally every 12 hours Take Celebrex 2 hours after aspirin  Multiple Vitamins oral tablet: 1 tab(s) orally once a day  omeprazole 20 mg oral delayed release capsule: 1 cap(s) orally once a day  oxyCODONE 5 mg oral tablet: 1 tab(s) orally every 4 hours as needed for  moderate pain May take 2 tabs for severe pain MDD: 6  polyethylene glycol 3350 oral powder for reconstitution: 17 gram(s) orally once a day (at bedtime)  senna leaf extract oral tablet: 2 tab(s) orally once a day (at bedtime)  Tylenol 500 mg oral tablet: 2 tab(s) orally every 8 hours as needed for  moderate pain  Uloric 40 mg oral tablet: 1 tab(s) orally once a day

## 2023-09-27 NOTE — CARE COORDINATION ASSESSMENT. - NSPASTMEDSURGHISTORY_GEN_ALL_CORE_FT
PAST MEDICAL & SURGICAL HISTORY:  Gout      Osteoarthritis      Class 2 obesity with body mass index (BMI) of 36.0 to 36.9 in adult      S/P reverse total shoulder arthroplasty, right      S/P total knee replacement, right

## 2023-09-27 NOTE — CONSULT NOTE ADULT - SUBJECTIVE AND OBJECTIVE BOX
Patient is a 63y old  Male who presents with a chief complaint of Right anterior total hip replacement (27 Sep 2023 09:37)        HPI: OA hip s/p right THR.  No pain.  No official diagnosis of CED but has risk factors.  Encouraged outpt testing.      PAST MEDICAL & SURGICAL HISTORY:  Class 2 obesity with body mass index (BMI) of 36.0 to 36.9 in adult      Osteoarthritis      Gout      S/P total knee replacement, right      S/P reverse total shoulder arthroplasty, right          REVIEW OF SYSTEMS:    other systems currently negative unless otherwise specified in HPI.      MEDICATIONS  (STANDING):  acetaminophen     Tablet .. 1000 milliGRAM(s) Oral every 8 hours  acetaminophen   IVPB .. 1000 milliGRAM(s) IV Intermittent once  ceFAZolin   IVPB 3000 milliGRAM(s) IV Intermittent every 8 hours  celecoxib 200 milliGRAM(s) Oral every 12 hours  pantoprazole    Tablet 40 milliGRAM(s) Oral before breakfast  polyethylene glycol 3350 17 Gram(s) Oral at bedtime  senna 2 Tablet(s) Oral at bedtime  sodium chloride 0.9% Bolus 500 milliLiter(s) IV Bolus once    MEDICATIONS  (PRN):  HYDROmorphone  Injectable 0.5 milliGRAM(s) IV Push every 3 hours PRN Severe Pain (7 - 10)  magnesium hydroxide Suspension 30 milliLiter(s) Oral daily PRN Constipation  ondansetron Injectable 4 milliGRAM(s) IV Push every 6 hours PRN Nausea and/or Vomiting  oxyCODONE    IR 5 milliGRAM(s) Oral every 3 hours PRN Moderate Pain (4 - 6)  oxyCODONE    IR 10 milliGRAM(s) Oral every 3 hours PRN Severe Pain (7 - 10)      Allergies    No Known Drug Allergies  Tree Nuts (Anaphylaxis; Urticaria)    Intolerances        SOCIAL HISTORY: No toxic habits reported currently    FAMILY HISTORY:  Family history of MI (myocardial infarction) (Father)    Family history of coronary artery disease in mother (Mother)        Vital Signs Last 24 Hrs  T(C): 36.4 (27 Sep 2023 11:15), Max: 37.4 (27 Sep 2023 06:19)  T(F): 97.6 (27 Sep 2023 11:15), Max: 99.4 (27 Sep 2023 06:19)  HR: 70 (27 Sep 2023 11:40) (65 - 80)  BP: 118/68 (27 Sep 2023 11:40) (95/71 - 138/80)  BP(mean): --  RR: 14 (27 Sep 2023 11:40) (13 - 23)  SpO2: 96% (27 Sep 2023 11:40) (95% - 100%)    Parameters below as of 27 Sep 2023 11:15  Patient On (Oxygen Delivery Method): room air        PHYSICAL EXAM:  GENERAL: No apparent distress, appears stated age  EYES: Conjunctiva and sclera clear, no discharge  ENMT: Moist mucous membranes, no nasal or ear discharge  NECK: Supple, no JVD  CHEST/LUNG: Clear to auscultation; no rales, wheeze or rubs  HEART: Regular rhythm, no rubs or gallops  ABDOMEN: Soft, Nontender, Nondistended  EXTREMITIES:  right THR dressing        LABS:                                                  IMAGING: imaging reviewed personally    Consultant Notes Reviewed and Care Discussed with relevant Consultants/Other Providers.

## 2023-09-27 NOTE — OCCUPATIONAL THERAPY INITIAL EVALUATION ADULT - LIVES WITH, PROFILE
Pt lives with his wife in a private home, 3 steps to enter, all needs met on the 1st floor, does have a full flight to the basement but does not need to use it. Pt has a tub on the 1st floor, walk in shower in the basement. Pt was independent with ADLs, IADLs, functional mobility/transfers prior to admission without AD./spouse

## 2023-09-27 NOTE — ASU PATIENT PROFILE, ADULT - FALL HARM RISK - UNIVERSAL INTERVENTIONS
Bed in lowest position, wheels locked, appropriate side rails in place/Call bell, personal items and telephone in reach/Instruct patient to call for assistance before getting out of bed or chair/Non-slip footwear when patient is out of bed/[place] to call system/Physically safe environment - no spills, clutter or unnecessary equipment/Purposeful Proactive Rounding/Room/bathroom lighting operational, light cord in reach

## 2023-09-27 NOTE — DISCHARGE NOTE PROVIDER - NSDCCPTREATMENT_GEN_ALL_CORE_FT
PRINCIPAL PROCEDURE  Procedure: Total replacement of right hip joint by anterior approach  Findings and Treatment:

## 2023-09-27 NOTE — BRIEF OPERATIVE NOTE - NSICDXBRIEFPROCEDURE_GEN_ALL_CORE_FT
PROCEDURES:  Total replacement of right hip joint by anterior approach 27-Sep-2023 09:34:54  Dior Trevizo

## 2023-09-27 NOTE — DISCHARGE NOTE PROVIDER - NSDCFUSCHEDAPPT_GEN_ALL_CORE_FT
Ni Torres  [facility]  [facility] [address] [facility]  Scheduled Appointment: 09/27/2023    Berenice Yates  [facility]  ORTHOSURG [address] [facility]  Scheduled Appointment: 10/13/2023    Ni Torres  [facility]  ORTHOSURG [address] [facility]  Scheduled Appointment: 11/21/2023     Berenice Yates  [facility]  ORTHOSURG [address] [facility]  Scheduled Appointment: 10/13/2023    Ni Torres  [facility]  ORTHOSUR[address]  Scheduled Appointment: 11/21/2023

## 2023-09-27 NOTE — DISCHARGE NOTE PROVIDER - NSDCCPCAREPLAN_GEN_ALL_CORE_FT
PRINCIPAL DISCHARGE DIAGNOSIS  Diagnosis: Primary osteoarthritis of right hip  Assessment and Plan of Treatment: PT/OT Anterior Total Hip Protocol; Isometrics, Ambulation, transfers, stairs, & ADLs  Full weight bearing both legs; Walker/cane use as instructed by PT/OT  Anterior THR precautions for 6 weeks: No straight leg raise; No external rotation of hip when extended-standing or lying flat; No hyperextension of hip when standing (kickback)  Ice packs to hip for 30 min.every 3 hours & post P.T.  Prineo tape/suture removal on/near after Post Op Day # 14 in Surgeon's office.  No tub baths  Do not resume driving until you have your surgeons permission  Instruct patient to see PCP in office near 2-3 weeks from discharge from rehab for exam/labwork.  You have a WU dressing. You may shower. Disconnect WU battery prior to showering. Reconnect battery after showering and press orange button to resume WU power. Remove WU dressing on post-op day #7.  Keep incision clean. DO NOT APPLY ANYTHING to incision site (salves/ointments/creams). Do not scrub incision site. Pat dry after shower.  You have prineo tape over your incision, this is a waterproof seal.  You may shower if there is no drainage present from wound.  Allow warm/soapy water to run over prineo tape, dab dry with a clean towel after shower.  No Salves/ointments over prineo tape, tape will be removed at surgeons office at post operative visit

## 2023-09-27 NOTE — CONSULT NOTE ADULT - ASSESSMENT
63 year-old male with    1. OA hip s/p right THR:  opiates prn + bowel regimen.   Physical Therapy evaluation.  DVT prophylaxis as per Orthopedic protocol.     2. Gout: wife to bring Urolic from home     3. Suspected CED  - monitor pulse ox overnight  - outpt sleep study    4. Dispo: pending PT eval.

## 2023-09-27 NOTE — DISCHARGE NOTE NURSING/CASE MANAGEMENT/SOCIAL WORK - NSSCNAMETXT_GEN_ALL_CORE
[facility] care agency [phone] will reach out to you within 24-72 hours of your discharge to schedule home care visit/eval appointment with you. Please call agency for any queries regarding home care services

## 2023-09-27 NOTE — PHYSICAL THERAPY INITIAL EVALUATION ADULT - ADDITIONAL COMMENTS
Pt lives with his wife in a private home, 3 steps to enter, all needs met on the 1st floor, does have a full flight to the basement but does not need to use it. Pt has a tub on the 1st floor, walk in shower in the basement.

## 2023-09-27 NOTE — OCCUPATIONAL THERAPY INITIAL EVALUATION ADULT - MANUAL MUSCLE TESTING RESULTS, REHAB EVAL
b/l UE and LLE at least 3+/5, R hip not tested 2* surgery, R knee at least 3/5/grossly assessed due to

## 2023-09-27 NOTE — DISCHARGE NOTE PROVIDER - HOSPITAL COURSE
This patient was admitted to [facility] with a history of severe degenerative joint disease of the right hip.  Patient went to Pre-Surgical Testing at [facility] and was medically cleared to undergo a right anterior total hip replacement by Dr. Torres on 9/27/23.  No operative or bulmaro-operative complications arose during patients hospital course.  Patient received antibiotic according to SCIP guidelines for infection prevention.  Aspirin was given for DVT prophylaxis.  Anesthesia, Medical Hospitalist, Physical Therapy and Occupational Therapy were consulted. Patient is stable for discharge with a good prognosis.  Appropriate discharge instructions and medications are provided in this document. This patient was admitted to [facility] with a history of severe degenerative joint disease of the right hip.  Patient went to Pre-Surgical Testing at [facility] and was medically cleared to undergo a right anterior total hip replacement by Dr. Torres on 9/27/23.  No operative or bulmaro-operative complications arose during patients hospital course.  Patient received antibiotic according to SCIP guidelines for infection prevention.  Aspirin was given for DVT prophylaxis.  Anesthesia, Medical Hospitalist, Physical Therapy and Occupational Therapy were consulted. Patient is stable for discharge with a good prognosis.  Appropriate discharge instructions and medications are provided in this document.   Patient is going home with home care (PT/OT/Skilled Nursing)

## 2023-09-27 NOTE — DISCHARGE NOTE PROVIDER - NSDCFUADDINST_GEN_ALL_CORE_FT
For Constipation :   • Increase your water intake. Drink at least 8 glasses of water daily.  • Try adding fiber to your diet by eating fruits, vegetables and foods that are rich in grains.  • If you do experience constipation, you may take an over-the-counter stool softener/laxative such as Sally Colace, Senekot or Milk of Magnesia.  - Call your doctor if you experience:  • An increase in pain not controlled by pain medication or change in activity or  position.  • Temperature greater than 101° F.  • Redness, increased swelling or foul smelling drainage from or around the  incision.  • Numbness, tingling or a change in color or temperature of the operative leg.  • Call your doctor immediately if you experience chest pain, shortness of breath or calf pain.  Pain medicine has been prescribed for you, as needed, and it often causes constipation.  Take docusate sodium (Colace) 100mg 3x daily, while taking narcotic pain medication.   For Constipation :   • Increase your water intake. Drink at least 8 glasses of water daily.  • Try adding fiber to your diet by eating fruits, vegetables and foods that are rich in grains.  • If you do experience constipation, you may take an over-the-counter laxative such as, Senokot, Miralax or  Milk of Magnesia.     - Call your doctor if you experience:  • An increase in pain not controlled by pain medication or change in activity or  position.  • Temperature greater than 101° F.  • Redness, increased swelling or foul smelling drainage from or around the  incision.  • Numbness, tingling or a change in color or temperature of the operative leg.  • Call your doctor immediately if you experience chest pain, shortness of breath or calf pain.

## 2023-09-27 NOTE — CARE COORDINATION ASSESSMENT. - NSCAREPROVIDERS_GEN_ALL_CORE_FT
CARE PROVIDERS:  Administration: Pamela Guadarrama  Admitting: Ni Torres  Attending: Ni Torres  Case Management: Keli Lance  Consultant: Weil, Patricia  Consultant: Romero Whelan  Consultant: Aaron Hinton  Covering Team: Dayday Horton  Nurse: Alessia Swanson  Nurse: Terri Enamorado  Nurse: Marlee Lwa  Nurse: Claudia Moore  Nurse: Joy Colin  Occupational Therapy: Lydia Kam  Override: Alessia Swanson  Override: Terri Enamorado  Patient Logistics: Aminata Brown  Physical Therapy: Judy Song  Primary Team: Eri Garcia  Primary Team: Radihka Epperson  Primary Team: Dior Trevizo  Primary Team: Phan Zabala  Primary Team: Brown Norman  Primary Team: Mary Reyna  Team: RADHA  Hospitalists, Team  UR// Supp. Assoc.: Edwige Mustafa

## 2023-09-27 NOTE — CAREGIVER ENGAGEMENT NOTE - CAREGIVER OUTREACH - FIRST ATTEMPT
Last visit: 4/13/21  Last Med refill: 4/13/21  Does patient have enough medication for 72 hours: No: patient states she is out     Next Visit Date:  Future Appointments   Date Time Provider Pradeep Alexander   9/27/2021 10:40 AM YOLANDA Quintana CNP [facility]   10/7/2021 10:15 AM Connie Jo MD [facility]   12/13/2021 10:30 AM Connie Jo  [facility]   Topic Date Due    Hepatitis C screen  Never done    Shingles Vaccine (1 of 2) Never done    Annual Wellness Visit (AWV)  Never done    Flu vaccine (1) 09/01/2021    DTaP/Tdap/Td vaccine (1 - Tdap) 10/18/2026 (Originally 5/28/1963)    Lipid screen  09/30/2021    Potassium monitoring  09/30/2021    Creatinine monitoring  09/30/2021    DEXA (modify frequency per FRAX score)  Completed    Pneumococcal 65+ years Vaccine  Completed    COVID-19 Vaccine  Completed    Hepatitis A vaccine  Aged Out    Hib vaccine  Aged Out    Meningococcal (ACWY) vaccine  Aged Out       Hemoglobin A1C (%)   Date Value   09/30/2020 5.7   02/25/2020 6.6   10/07/2019 6.5             ( goal A1C is < 7)   Microalb/Crt.  Ratio (mcg/mg creat)   Date Value   10/06/2020 CANNOT BE CALCULATED     LDL Cholesterol (mg/dL)   Date Value   09/30/2020 48   06/03/2019 102       (goal LDL is <100)   AST (U/L)   Date Value   09/30/2020 20     ALT (U/L)   Date Value   09/30/2020 13     BUN (mg/dL)   Date Value   09/30/2020 9     BP Readings from Last 3 Encounters:   08/23/21 (!) 144/79   07/26/21 (!) 180/103   04/29/21 (!) 177/82          (goal 120/80)    All Future Testing planned in [facility]  Lab Frequency Next Occurrence   Drugs of Abuse, Urine Once 02/03/2021               Patient Active Problem List:     Osteoarthritis     Essential hypertension     Type 2 diabetes mellitus with diabetic neuropathy, without long-term current use of insulin (HCC)     Pure hypercholesterolemia     Constipation     Rectal pain     Diverticulosis of
Patient declined

## 2023-09-27 NOTE — CARE COORDINATION ASSESSMENT. - NSDCPLANSERVICES_GEN_ALL_CORE
CM met with patient at bedside.  Patient. A&O x 4. Pt s/p  PROCEDURES: Total Right hip replacement.   Pt  resting comfortably / Pt has no complaints at this time.  CM explained role of CM and availability to assist with discharge planning throughout stay.   Provided CM contact information and pt. verbalized understanding. Patient lives in a private house with his wife, 3 steps to enter. Prior to surgery patient was ind in adls. Patient identified his wife  as his caregiver at home who will assist him during his recuperation and will transport him home and to MD appointments.   Pt. is agreeable with PT/OT's recommendations for d/c plan to home with HC/PT.  CM explained home care expectations, process, insurance provisions and home safety with good understanding.   Offered list of [facility] and pt. chose [facility] at home care agency.  Provided discharge resources folder. CM made referral accordingly.  Informed them of Anticipated  DC date for 9/28/2023 and for SOC 9/29/2023.  Patient is agreeable with dc plan.  DME: [facility] [phone] will deliver  RW, commode Thursday morning.   PCP: Dr Astudillo [phone]  Pharm: [facility] [address] [phone]/Home Care

## 2023-09-27 NOTE — DISCHARGE NOTE NURSING/CASE MANAGEMENT/SOCIAL WORK - PATIENT PORTAL LINK FT
You can access the FollowMyHealth Patient Portal offered by [facility] by registering at the following website: [email or web address]. By joining [employer]’s FollowMyHealth portal, you will also be able to view your health information using other applications (apps) compatible with our system.

## 2023-09-27 NOTE — PATIENT CHOICE NOTE. - NSPTCHOICESTATE_GEN_ALL_CORE

## 2023-09-27 NOTE — DISCHARGE NOTE PROVIDER - CARE PROVIDER_API CALL
Ni Torres  Orthopaedic Surgery  [address], [address]  [place] [address]  Phone: [phone]  Fax: [phone]  Scheduled Appointment: 10/13/2023 01:30 PM

## 2023-09-28 VITALS
HEART RATE: 74 BPM | DIASTOLIC BLOOD PRESSURE: 77 MMHG | OXYGEN SATURATION: 93 % | TEMPERATURE: 99 F | SYSTOLIC BLOOD PRESSURE: 128 MMHG | RESPIRATION RATE: 18 BRPM

## 2023-09-28 LAB
ANION GAP SERPL CALC-SCNC: 6 MMOL/L — SIGNIFICANT CHANGE UP (ref 5–17)
BUN SERPL-MCNC: 18 MG/DL — SIGNIFICANT CHANGE UP (ref 7–23)
CALCIUM SERPL-MCNC: 8.6 MG/DL — SIGNIFICANT CHANGE UP (ref 8.4–10.5)
CHLORIDE SERPL-SCNC: 102 MMOL/L — SIGNIFICANT CHANGE UP (ref 96–108)
CO2 SERPL-SCNC: 30 MMOL/L — SIGNIFICANT CHANGE UP (ref 22–31)
CREAT SERPL-MCNC: 1.23 MG/DL — SIGNIFICANT CHANGE UP (ref 0.5–1.3)
EGFR: 65 ML/MIN/1.73M2 — SIGNIFICANT CHANGE UP
GLUCOSE SERPL-MCNC: 98 MG/DL — SIGNIFICANT CHANGE UP (ref 70–99)
HCT VFR BLD CALC: 36.5 % — LOW (ref 39–50)
HGB BLD-MCNC: 12 G/DL — LOW (ref 13–17)
MCHC RBC-ENTMCNC: 30.2 PG — SIGNIFICANT CHANGE UP (ref 27–34)
MCHC RBC-ENTMCNC: 32.9 GM/DL — SIGNIFICANT CHANGE UP (ref 32–36)
MCV RBC AUTO: 91.7 FL — SIGNIFICANT CHANGE UP (ref 80–100)
NRBC # BLD: 0 /100 WBCS — SIGNIFICANT CHANGE UP (ref 0–0)
PLATELET # BLD AUTO: 188 K/UL — SIGNIFICANT CHANGE UP (ref 150–400)
POTASSIUM SERPL-MCNC: 4.7 MMOL/L — SIGNIFICANT CHANGE UP (ref 3.5–5.3)
POTASSIUM SERPL-SCNC: 4.7 MMOL/L — SIGNIFICANT CHANGE UP (ref 3.5–5.3)
RBC # BLD: 3.98 M/UL — LOW (ref 4.2–5.8)
RBC # FLD: 13.6 % — SIGNIFICANT CHANGE UP (ref 10.3–14.5)
SODIUM SERPL-SCNC: 138 MMOL/L — SIGNIFICANT CHANGE UP (ref 135–145)
WBC # BLD: 7.24 K/UL — SIGNIFICANT CHANGE UP (ref 3.8–10.5)
WBC # FLD AUTO: 7.24 K/UL — SIGNIFICANT CHANGE UP (ref 3.8–10.5)

## 2023-09-28 PROCEDURE — 97116 GAIT TRAINING THERAPY: CPT

## 2023-09-28 PROCEDURE — 97165 OT EVAL LOW COMPLEX 30 MIN: CPT

## 2023-09-28 PROCEDURE — 94664 DEMO&/EVAL PT USE INHALER: CPT

## 2023-09-28 PROCEDURE — 36415 COLL VENOUS BLD VENIPUNCTURE: CPT

## 2023-09-28 PROCEDURE — 97535 SELF CARE MNGMENT TRAINING: CPT

## 2023-09-28 PROCEDURE — C1776: CPT

## 2023-09-28 PROCEDURE — C1713: CPT

## 2023-09-28 PROCEDURE — 97161 PT EVAL LOW COMPLEX 20 MIN: CPT

## 2023-09-28 PROCEDURE — C1889: CPT

## 2023-09-28 PROCEDURE — 80048 BASIC METABOLIC PNL TOTAL CA: CPT

## 2023-09-28 PROCEDURE — 76000 FLUOROSCOPY <1 HR PHYS/QHP: CPT

## 2023-09-28 PROCEDURE — 27130 TOTAL HIP ARTHROPLASTY: CPT | Mod: RT

## 2023-09-28 PROCEDURE — 97530 THERAPEUTIC ACTIVITIES: CPT

## 2023-09-28 PROCEDURE — 99213 OFFICE O/P EST LOW 20 MIN: CPT

## 2023-09-28 PROCEDURE — 85027 COMPLETE CBC AUTOMATED: CPT

## 2023-09-28 RX ORDER — OXYCODONE HYDROCHLORIDE 5 MG/1
1 TABLET ORAL
Qty: 42 | Refills: 0
Start: 2023-09-28 | End: 2023-10-04

## 2023-09-28 RX ADMIN — CELECOXIB 200 MILLIGRAM(S): 200 CAPSULE ORAL at 09:13

## 2023-09-28 RX ADMIN — Medication 81 MILLIGRAM(S): at 05:44

## 2023-09-28 RX ADMIN — CELECOXIB 200 MILLIGRAM(S): 200 CAPSULE ORAL at 08:43

## 2023-09-28 RX ADMIN — Medication 1000 MILLIGRAM(S): at 05:49

## 2023-09-28 RX ADMIN — Medication 1000 MILLIGRAM(S): at 05:44

## 2023-09-28 RX ADMIN — SODIUM CHLORIDE 100 MILLILITER(S): 9 INJECTION, SOLUTION INTRAVENOUS at 05:45

## 2023-09-28 RX ADMIN — PANTOPRAZOLE SODIUM 40 MILLIGRAM(S): 20 TABLET, DELAYED RELEASE ORAL at 05:44

## 2023-09-28 RX ADMIN — Medication 101.6 MILLIGRAM(S): at 05:44

## 2023-09-28 NOTE — PHARMACOTHERAPY INTERVENTION NOTE - COMMENTS
Admission medication reconciliation POD1
Admission medication reconciliation POD1  
Transition of Care video discharge education - medication calendar given to patient

## 2023-09-28 NOTE — PROGRESS NOTE ADULT - ASSESSMENT
63 year-old male with    1. OA hip s/p right THR:  opiates prn + bowel regimen.   Physical Therapy evaluation.  DVT prophylaxis as per Orthopedic protocol.     2. Gout: Urolic    3. Suspected CED  - monitor pulse ox overnight - no hypoxemic events  - outpt sleep study    4. Dispo: home with home PT today  spent 38 mins

## 2023-10-13 ENCOUNTER — APPOINTMENT (OUTPATIENT)
Dept: ORTHOPEDIC SURGERY | Facility: CLINIC | Age: 63
End: 2023-10-13
Payer: MEDICARE

## 2023-10-13 VITALS — SYSTOLIC BLOOD PRESSURE: 155 MMHG | DIASTOLIC BLOOD PRESSURE: 74 MMHG | HEART RATE: 90 BPM

## 2023-10-13 PROCEDURE — 99024 POSTOP FOLLOW-UP VISIT: CPT

## 2023-10-13 PROCEDURE — 73502 X-RAY EXAM HIP UNI 2-3 VIEWS: CPT | Mod: RT

## 2023-10-13 RX ORDER — CEPHALEXIN 500 MG/1
500 CAPSULE ORAL 3 TIMES DAILY
Qty: 30 | Refills: 0 | Status: ACTIVE | COMMUNITY
Start: 2023-10-13 | End: 1900-01-01

## 2023-10-13 RX ORDER — CELECOXIB 200 MG/1
200 CAPSULE ORAL
Refills: 0 | Status: ACTIVE | COMMUNITY

## 2023-10-13 RX ORDER — ACETAMINOPHEN 500 MG/1
500 TABLET ORAL
Refills: 0 | Status: ACTIVE | COMMUNITY

## 2023-10-13 RX ORDER — AMOXICILLIN 500 MG/1
500 CAPSULE ORAL
Qty: 12 | Refills: 2 | Status: ACTIVE | COMMUNITY
Start: 2023-10-13

## 2023-10-16 ENCOUNTER — APPOINTMENT (OUTPATIENT)
Dept: ORTHOPEDIC SURGERY | Facility: CLINIC | Age: 63
End: 2023-10-16
Payer: MEDICARE

## 2023-10-16 DIAGNOSIS — T81.49XA INFECTION FOLLOWING A PROCEDURE, OTHER SURGICAL SITE, INITIAL ENCOUNTER: ICD-10-CM

## 2023-10-16 PROCEDURE — 99024 POSTOP FOLLOW-UP VISIT: CPT

## 2023-10-19 ENCOUNTER — APPOINTMENT (OUTPATIENT)
Dept: ORTHOPEDIC SURGERY | Facility: CLINIC | Age: 63
End: 2023-10-19
Payer: MEDICARE

## 2023-10-19 VITALS
HEIGHT: 73 IN | DIASTOLIC BLOOD PRESSURE: 81 MMHG | BODY MASS INDEX: 35.12 KG/M2 | WEIGHT: 265 LBS | SYSTOLIC BLOOD PRESSURE: 134 MMHG | HEART RATE: 77 BPM

## 2023-10-19 VITALS — TEMPERATURE: 97.9 F

## 2023-10-19 PROCEDURE — 99024 POSTOP FOLLOW-UP VISIT: CPT

## 2023-10-24 ENCOUNTER — APPOINTMENT (OUTPATIENT)
Dept: ORTHOPEDIC SURGERY | Facility: CLINIC | Age: 63
End: 2023-10-24
Payer: MEDICARE

## 2023-10-24 VITALS — DIASTOLIC BLOOD PRESSURE: 83 MMHG | TEMPERATURE: 98.1 F | SYSTOLIC BLOOD PRESSURE: 130 MMHG | HEART RATE: 71 BPM

## 2023-10-24 PROCEDURE — 99024 POSTOP FOLLOW-UP VISIT: CPT

## 2023-11-01 ENCOUNTER — APPOINTMENT (OUTPATIENT)
Dept: ORTHOPEDIC SURGERY | Facility: CLINIC | Age: 63
End: 2023-11-01
Payer: MEDICARE

## 2023-11-01 VITALS — HEART RATE: 87 BPM | SYSTOLIC BLOOD PRESSURE: 137 MMHG | DIASTOLIC BLOOD PRESSURE: 84 MMHG

## 2023-11-01 PROCEDURE — 99024 POSTOP FOLLOW-UP VISIT: CPT

## 2023-11-01 RX ORDER — SULFAMETHOXAZOLE AND TRIMETHOPRIM 800; 160 MG/1; MG/1
800-160 TABLET ORAL TWICE DAILY
Qty: 14 | Refills: 0 | Status: ACTIVE | COMMUNITY
Start: 2023-10-16 | End: 1900-01-01

## 2023-11-07 ENCOUNTER — APPOINTMENT (OUTPATIENT)
Dept: ORTHOPEDIC SURGERY | Facility: CLINIC | Age: 63
End: 2023-11-07
Payer: MEDICARE

## 2023-11-07 DIAGNOSIS — T81.31XA DISRUPTION OF EXTERNAL OPERATION (SURGICAL) WOUND, NOT ELSEWHERE CLASSIFIED, INITIAL ENCOUNTER: ICD-10-CM

## 2023-11-07 PROCEDURE — 99024 POSTOP FOLLOW-UP VISIT: CPT

## 2023-11-21 ENCOUNTER — APPOINTMENT (OUTPATIENT)
Dept: ORTHOPEDIC SURGERY | Facility: CLINIC | Age: 63
End: 2023-11-21

## 2023-11-29 ENCOUNTER — APPOINTMENT (OUTPATIENT)
Dept: ORTHOPEDIC SURGERY | Facility: CLINIC | Age: 63
End: 2023-11-29
Payer: MEDICARE

## 2023-11-29 VITALS
DIASTOLIC BLOOD PRESSURE: 91 MMHG | BODY MASS INDEX: 35.78 KG/M2 | HEART RATE: 66 BPM | HEIGHT: 73 IN | WEIGHT: 270 LBS | SYSTOLIC BLOOD PRESSURE: 154 MMHG

## 2023-11-29 PROCEDURE — 99024 POSTOP FOLLOW-UP VISIT: CPT

## 2023-11-29 PROCEDURE — 73502 X-RAY EXAM HIP UNI 2-3 VIEWS: CPT | Mod: RT

## 2023-12-13 ENCOUNTER — APPOINTMENT (OUTPATIENT)
Dept: ORTHOPEDIC SURGERY | Facility: CLINIC | Age: 63
End: 2023-12-13
Payer: MEDICARE

## 2023-12-13 VITALS
HEIGHT: 73 IN | SYSTOLIC BLOOD PRESSURE: 144 MMHG | BODY MASS INDEX: 35.78 KG/M2 | DIASTOLIC BLOOD PRESSURE: 86 MMHG | WEIGHT: 270 LBS | HEART RATE: 77 BPM

## 2023-12-13 DIAGNOSIS — Z96.641 PRESENCE OF RIGHT ARTIFICIAL HIP JOINT: ICD-10-CM

## 2023-12-13 DIAGNOSIS — Z51.89 ENCOUNTER FOR OTHER SPECIFIED AFTERCARE: ICD-10-CM

## 2023-12-13 PROCEDURE — 99024 POSTOP FOLLOW-UP VISIT: CPT

## 2023-12-13 NOTE — HISTORY OF PRESENT ILLNESS
[___ Weeks Post Op] : [unfilled] weeks post op [4] : the patient reports pain that is 4/10 in severity [Chills] : no chills [Constipation] : no constipation [Diarrhea] : no diarrhea [Dysuria] : no dysuria [Fever] : no fever [Nausea] : no nausea [Vomiting] : no vomiting [Healed] : not healed [Erythema] : not erythematous [Discharge] : absent of discharge [Swelling] : not swollen [Dehiscence] : dehisced [Neuro Intact] : an unremarkable neurological exam [Vascular Intact] : ~T peripheral vascular exam normal [Negative Jeni's] : maneuvers demonstrated a negative Jeni's sign [Xray (Date:___)] : [unfilled] Xray -  [Doing Well] : is doing well [No Sign of Infection] : is showing no signs of infection [Adequate Pain Control] : has adequate pain control [de-identified] : The patient is 63 yr old male presents for a wound check. He is s/p right Anterior Total hip replacement performed at [facility] on 9/27/2023.  [de-identified] :  He presents for his wound check. He is doing well ambulating independently without an assistive device. He is ambulating 1- 2 miles /day and performing home exercises. The patient reports pain of 0/10 He continues to use Tylenol as needed, applying ice and elevating the lower extremity.  The patient dressing to the right anterior hip incision intact. The spouse is changing the dressing every 2-3 days. The patient reports the drainage is less and the wounds appear smaller in size.    [de-identified] : The patient has stable gait. The right anterior hip is with Aquacel  Bandaid dressing intact with no  drainage noted. The incision site is no redness. The hip flexes to 90 degrees with minimal discomfort. The external and internal rotation of the right hip is with minimal discomfort and stiffness. Leg lengths appear equal. There is evidence of superficial cellulitis. The calf is supple and without tenderness, warmth or redness.  [de-identified] : The aquacel dressing was removed from the right hip incision. The right hip incision dehiscence at proximal end measuring about 1.5 cm x 0.5 cm healed and mid dehisced wound measuring 1cm x 0.5 cm. healing nicely. Betadine applied to the mid incision wound bed and a bandaid was applied utilizing sterile technique. Incisional care was reviewed with the patient. The patient was advised of the nature of the healing process.   The patient was advised to continue to ambulate and to perform home exercises as recommended. Patient will continue to use Tylenol as needed for pain management.  Patient will follow up in 1 yr or sooner if needed. Patient verbalized understanding of all instructions and all of his questions were addressed to his satisfaction. The patient was advised to call the office with any further questions or concern My cumulative time spent on this patient's visit included: Preparation for the visit, review of the medical records, review of pertinent diagnostic studies, examination and counseling of the patient on the above diagnosis, treatment plan and prognosis, orders of diagnostic tests, medications and/or appropriate procedures and documentation in the medical records of today's visit.  Not including time spent for procedure.

## 2024-06-03 ENCOUNTER — APPOINTMENT (OUTPATIENT)
Dept: ORTHOPEDIC SURGERY | Facility: CLINIC | Age: 64
End: 2024-06-03

## 2024-06-03 VITALS
SYSTOLIC BLOOD PRESSURE: 137 MMHG | HEART RATE: 70 BPM | DIASTOLIC BLOOD PRESSURE: 86 MMHG | BODY MASS INDEX: 34.85 KG/M2 | HEIGHT: 73 IN | WEIGHT: 263 LBS

## 2024-06-03 DIAGNOSIS — M17.12 UNILATERAL PRIMARY OSTEOARTHRITIS, LEFT KNEE: ICD-10-CM

## 2024-06-03 PROCEDURE — 99213 OFFICE O/P EST LOW 20 MIN: CPT | Mod: 25

## 2024-06-03 PROCEDURE — 20610 DRAIN/INJ JOINT/BURSA W/O US: CPT | Mod: LT

## 2024-06-03 RX ORDER — METHYLPRED ACET/NACL,ISO-OS/PF 40 MG/ML
40 VIAL (ML) INJECTION
Refills: 0 | Status: COMPLETED | OUTPATIENT
Start: 2024-06-03

## 2024-06-03 RX ORDER — LIDOCAINE HYDROCHLORIDE 10 MG/ML
1 INJECTION, SOLUTION INFILTRATION; PERINEURAL
Refills: 0 | Status: COMPLETED | OUTPATIENT
Start: 2024-06-03

## 2024-06-03 RX ADMIN — METHYLPREDNISOLONE ACETATE 2 MG/ML: 40 INJECTION, SUSPENSION INTRA-ARTICULAR; INTRALESIONAL; INTRAMUSCULAR; SOFT TISSUE at 00:00

## 2024-06-03 RX ADMIN — LIDOCAINE HYDROCHLORIDE 3 %: 10 INJECTION, SOLUTION INFILTRATION; PERINEURAL at 00:00

## 2024-06-03 NOTE — DISCUSSION/SUMMARY
[de-identified] : The patient was advised on the activities for today. I gave the patient instructions on postinjection ice and analgesia. The patient had her questions answered and gave a clear understanding of the instructions. He was advised she can call the office at any time with any questions or concerns  The patient will follow up as needed My cumulative time spent on this patient's visit included: Preparation for the visit, review of the medical records, review of pertinent diagnostic studies, examination and counseling of the patient on the above diagnosis, treatment plan and prognosis, orders of diagnostic tests, medications and/or appropriate procedures and documentation in the medical records of today's visit.  Not including time spent for procedures

## 2024-06-03 NOTE — PROCEDURE
[Injection] : Injection [Left] : of the left [Knee Joint] : knee joint [Osteoarthritis] : Osteoarthritis [Joint Pain] : Joint pain [Patient] : patient [Risk] : risk [Benefits] : benefits [Bleeding] : bleeding [Infection] : infection [Allergic Reaction] : allergic reaction [Verbal Consent Obtained] : verbal consent was obtained prior to the procedure [Ethyl Chloride Spray] : ethyl chloride spray was used as a topical anesthetic [Lateral] : lateral [22] : a 22-gauge [1% Lidocaine___(mL)] : [unfilled] mL of 1% Lidocaine [Methylpred. 40mg/mL___(mL)] : [unfilled] mL of 40mg/mL methylprednisolone [Bandage Applied] : a bandage [Tolerated Well] : The patient tolerated the procedure well [None] : none [No Strenuous Activity___day(s)] : avoid strenuous activity for [unfilled] day(s) [PRN] : as needed [FreeTextEntry1] : chlorhexadine

## 2024-06-03 NOTE — PHYSICAL EXAM
[Antalgic] : antalgic [LE] : Sensory: Intact in bilateral lower extremities [Knee] : patellar 2+ and symmetric bilaterally [Knee Swelling Right] : no swelling [Knee Tenderness On Palpation Right] : no tenderness [Knee Motion Right Crepitus] : no crepitus with ROM [Knee Motion Right] : limited ROM [Knee Swelling Left] : swelling [Knee Tenderness On Palpation Left] : tenderness [Knee Motion Left Crepitus] : crepitus with ROM [Knee Motion Left] : full ROM [de-identified] : No imaging was obtained at todays visit

## 2024-06-03 NOTE — HISTORY OF PRESENT ILLNESS
[de-identified] : The patient is a 63 yr old male presents today for evaluation of the left knee. The patient is complaining of osteoarthritic symptoms. He reports (+) stiffness and (+) swelling to the left knee with limited ROM. The patient was seen by Dr Torres in the past and was given a cortisone injection with good relief. The last cortisone injection to the left knee was 2023.  The patient denies allergies to medications  The patient was identified by name and  and the patient verifies that we are treating the left knee today.   [Pain Location] : pain [] : left knee [Standing] : standing [Lifting] : lifting [Daily] : ~He/She~ states the symptoms seem to be occuring daily [Bending] : worsened by bending [Direct Pressure] : worsened by direct pressure [Walking] : worsened by walking [Knee Flexion] : worsened with knee flexion [Knee Extension] : worsened with knee extension [Acetaminophen] : relieved by acetaminophen [Ice] : relieved by ice

## 2025-02-25 ENCOUNTER — APPOINTMENT (OUTPATIENT)
Dept: ORTHOPEDIC SURGERY | Facility: CLINIC | Age: 65
End: 2025-02-25
Payer: MEDICARE

## 2025-02-25 DIAGNOSIS — M16.12 UNILATERAL PRIMARY OSTEOARTHRITIS, LEFT HIP: ICD-10-CM

## 2025-02-25 PROCEDURE — 73562 X-RAY EXAM OF KNEE 3: CPT | Mod: LT

## 2025-02-25 PROCEDURE — 73502 X-RAY EXAM HIP UNI 2-3 VIEWS: CPT | Mod: LT

## 2025-02-25 PROCEDURE — 99214 OFFICE O/P EST MOD 30 MIN: CPT | Mod: 25

## 2025-02-25 PROCEDURE — 20610 DRAIN/INJ JOINT/BURSA W/O US: CPT | Mod: LT

## 2025-02-25 RX ORDER — METHYLPRED ACET/NACL,ISO-OS/PF 40 MG/ML
40 VIAL (ML) INJECTION
Refills: 0 | Status: COMPLETED | OUTPATIENT
Start: 2025-02-25

## 2025-02-25 RX ORDER — LIDOCAINE HYDROCHLORIDE 10 MG/ML
1 INJECTION, SOLUTION INFILTRATION; PERINEURAL
Refills: 0 | Status: COMPLETED | OUTPATIENT
Start: 2025-02-25

## 2025-02-25 RX ADMIN — LIDOCAINE HYDROCHLORIDE 3 %: 10 INJECTION, SOLUTION INFILTRATION; PERINEURAL at 00:00

## 2025-02-25 RX ADMIN — METHYLPREDNISOLONE ACETATE 2 MG/ML: 40 INJECTION, SUSPENSION INTRA-ARTICULAR; INTRALESIONAL; INTRAMUSCULAR; SOFT TISSUE at 00:00

## 2025-02-25 NOTE — END OF VISIT
[FreeTextEntry3] : I, Dr. Quintanilla, personally performed the evaluation and management (E/M) services for this established patient who presents today with an exacerbation of an existing condition. That E/M includes conducting the clinically appropriate interval history &/or exam, assessing all new/exacerbated conditions, and establishing a new plan of care. Today, my EMANUEL, Jimbo Galicia PA-C, was here to observe my evaluation and management service for this new problem/exacerbated condition and follow the plan of care established by me going forward.

## 2025-02-25 NOTE — PROCEDURE
[Injection] : Injection [Left] : of the left [Knee Joint] : knee joint [Osteoarthritis] : Osteoarthritis [Joint Pain] : Joint pain [Patient] : patient [Risk] : risk [Benefits] : benefits [Alternatives] : alternatives [Bleeding] : bleeding [Infection] : infection [Allergic Reaction] : allergic reaction [Verbal Consent Obtained] : verbal consent was obtained prior to the procedure [Ethyl Chloride Spray] : ethyl chloride spray was used as a topical anesthetic [Medial] : medial [Anterior] : anterior [1% Lidocaine___(mL)] : [unfilled] mL of 1% Lidocaine [Methylpred. 40mg/mL___(mL)] : [unfilled] mL of 40mg/mL methylprednisolone [Bandage Applied] : a bandage [Tolerated Well] : The patient tolerated the procedure well [None] : none [No Strenuous Activity___day(s)] : avoid strenuous activity for [unfilled] day(s) [PRN] : as needed [FreeTextEntry1] : Chlorhexidine

## 2025-02-25 NOTE — HISTORY OF PRESENT ILLNESS
[Worsening] : worsening [7] : a current pain level of 7/10 [Standing] : standing [Intermit.] : ~He/She~ states the symptoms seem to be intermittent [Hip Movement] : worsened by hip movement [Walking] : worsened by walking [Knee Flexion] : worsened with knee flexion [Knee Extension] : worsened with knee extension [NSAIDs] : relieved by nonsteroidal anti-inflammatory drugs [Rest] : relieved by rest [de-identified] : 64-year-old male presents for follow-up of the left knee.  He states that he has known osteoarthritis of the left knee and received a cortisone injection into the left knee back in June with great relief.  He states that once that wore off he started to have a lot of pain in the knee as well as the left hip.  He would like a repeat injection into both hip and the knee today.  He denies any new injuries.  Occasionally he will take meloxicam on an as-needed basis with improvement in symptoms as well.  He states he usually has great relief from cortisone injections in the past. Denies fevers, chills, chest pain, calf pain, shortness of breath.

## 2025-02-25 NOTE — DISCUSSION/SUMMARY
[de-identified] :  Reviewed risks/benefits/alternatives to cortisone injection in detail with the patient including infection and the possibility it may not provide relief. Advised injections are conservative and are not a cure for osteoarthritis. Patient expressed understanding and would like to proceed. The injection was well tolerated without side effects. Reviewed post injection care in detail with the patient including rest, ice, anti-inflammatories, elevation, and warm moist heat as needed. Reviewed timing and frequency of injections. They may continue with activities within tolerances. All patients questions and concerns were addressed to satisfaction. Advised the patient to call the office if any new or worsening symptoms arise. Ultrasound guided injection prescription was provided to the patient.

## 2025-02-25 NOTE — REASON FOR VISIT
[Follow-Up Visit] : a follow-up visit for [Hip Pain] : hip pain [Osteoarthritis, Hip] : osteoarthritis of the hip [Knee Pain] : knee pain [Osteoarthritis, Knee] : osteoarthritis of the knee

## 2025-02-25 NOTE — PHYSICAL EXAM
[Antalgic] : antalgic [LE] : Sensory: Intact in bilateral lower extremities [DP] : dorsalis pedis 2+ and symmetric bilaterally [Normal LLE] : Left Lower Extremity: No scars, rashes, lesions, ulcers, skin intact [Normal Touch] : sensation intact for touch [Normal] : no peripheral adenopathy appreciated [de-identified] : Left Knee: Skin is clean, dry, intact. Swelling: No significant swelling Effusion: No significant effusion Alignment: Neutral alignment Tenderness: medial joint line Incision: No visible incisions Skin Temp: Warm to touch ROM: Flexion: 115 deg.; Extension: 0 deg, Laxity A-P: Negative anteroposterior drawer Laxity M-L: Less than 5 degree laxity varus valgus stresses PF crepitus: None Sensation intact throughout the distal lower extremity Pulses: 2+ dorsalis pedis pulses Quad/Ham St: 5/5  On physical exam of the left hip skin is warm and dry without erythema ecchymosis signs or symptoms of infection superficial or deep.  There is no tenderness noted.  Range of motion is full with mild pain with internal and external rotation.  Strength is maintained in all planes.  Sensation is intact throughout the distal lower extremity.  There are 2+ dorsalis pedis pulse.  Leg lengths appear equal measured at the medial malleolus. Negative Keo's exam [de-identified] : 3 views, AP, lateral, and sunrise radiographs of the left knee were performed today in the [place] office. KL grade 4 bone-on-bone cartilaginous wear in all 3 compartments. Subchondral sclerosis noted on both sides the T-F  joint. Osteophytes are noted in all three compartments.  Two radiographs of the pelvis and left hip were performed today in the [place] office. KL grade 3 cartilaginous wear in the F- A joint. Subchondral sclerosis noted on both sides the F-A  joint. Superior and inferior rim osteophytes are noted.

## 2025-02-26 RX ORDER — MELOXICAM 15 MG/1
15 TABLET ORAL
Qty: 14 | Refills: 1 | Status: ACTIVE | COMMUNITY
Start: 2025-02-26 | End: 1900-01-01

## 2025-03-21 ENCOUNTER — APPOINTMENT (OUTPATIENT)
Dept: ULTRASOUND IMAGING | Facility: CLINIC | Age: 65
End: 2025-03-21
Payer: MEDICARE

## 2025-03-21 PROCEDURE — 20611 DRAIN/INJ JOINT/BURSA W/US: CPT | Mod: LT

## 2025-08-29 RX ORDER — MELOXICAM 7.5 MG/1
7.5 TABLET ORAL
Qty: 30 | Refills: 0 | Status: ACTIVE | COMMUNITY
Start: 2025-08-29 | End: 1900-01-01

## 2025-09-19 ENCOUNTER — APPOINTMENT (OUTPATIENT)
Dept: ORTHOPEDIC SURGERY | Facility: CLINIC | Age: 65
End: 2025-09-19
Payer: MEDICARE

## 2025-09-19 VITALS
HEART RATE: 74 BPM | SYSTOLIC BLOOD PRESSURE: 156 MMHG | WEIGHT: 263 LBS | DIASTOLIC BLOOD PRESSURE: 84 MMHG | HEIGHT: 73 IN | BODY MASS INDEX: 34.85 KG/M2

## 2025-09-19 DIAGNOSIS — M17.12 UNILATERAL PRIMARY OSTEOARTHRITIS, LEFT KNEE: ICD-10-CM

## 2025-09-19 PROCEDURE — 99214 OFFICE O/P EST MOD 30 MIN: CPT

## 2025-09-19 PROCEDURE — 73502 X-RAY EXAM HIP UNI 2-3 VIEWS: CPT

## (undated) DEVICE — DRSG STOCKINETTE TUBULAR COTTON 2PLY 6X72"

## (undated) DEVICE — WOUND IRR IRRISEPT W 0.5 CHG

## (undated) DEVICE — VENODYNE/SCD SLEEVE CALF MEDIUM

## (undated) DEVICE — SOL IRR BAG NS 0.9% 3000ML

## (undated) DEVICE — DRAPE XL SHEET 77X98"

## (undated) DEVICE — NDL SPINAL 18G X 3.5" (PINK)

## (undated) DEVICE — WARMING BLANKET UPPER ADULT

## (undated) DEVICE — SUT QUILL PDO 0 36CM 36MM

## (undated) DEVICE — HANDPIECE INTERPULSE W MULTI TIP

## (undated) DEVICE — SUT POLYSORB 3-0 27" GS-11 UNDYED

## (undated) DEVICE — SYR LUER LOK 50CC

## (undated) DEVICE — ELCTR ROCKER SWITCH PENCIL BLUE 10FT

## (undated) DEVICE — DRAPE TOP SHEET 53" X 101"

## (undated) DEVICE — SAW BLADE STRYKER SAGITTAL AGGRESSIVE 25X86.5X1.32MM

## (undated) DEVICE — POSITIONER POSITIONING ROLL  5X17"

## (undated) DEVICE — SUT VICRYL PLUS 1 27" CP UNDYED

## (undated) DEVICE — DRSG DERMABOND PRINEO 60CM

## (undated) DEVICE — ELCTR AQUAMANTYS BIPOLAR SEALER 6.0

## (undated) DEVICE — SUT STRATAFIX SPIRAL MONOCRYL PLUS 3-0 30CM PS-2 UNDYED

## (undated) DEVICE — DRAPE C ARM 41X140"

## (undated) DEVICE — SOL IRR POUR NS 0.9% 500ML

## (undated) DEVICE — SOL IRR POUR H2O 1500ML

## (undated) DEVICE — DRSG COBAN 6"

## (undated) DEVICE — HOOD FLYTE STRYKER HELMET SHIELD

## (undated) DEVICE — PACK TOTAL HIP

## (undated) DEVICE — DRAPE MAYO STAND 30"

## (undated) DEVICE — DRAPE SUPINE ESYSUIT